# Patient Record
(demographics unavailable — no encounter records)

---

## 2024-10-08 NOTE — DISCUSSION/SUMMARY
[] : The components of the vaccine(s) to be administered today are listed in the plan of care. The disease(s) for which the vaccine(s) are intended to prevent and the risks have been discussed with the caretaker.  The risks are also included in the appropriate vaccination information statements which have been provided to the patient's caregiver.  The caregiver has given consent to vaccinate. [FreeTextEntry1] : 1 yo well, nml development BMI 96%, healthy nutrition discussed, advised d/c bottles declined flu vaccine  labs drawn Continue cow's milk, may switch to lower fat. Continue table foods, 3 meals with 2-3 snacks per day. Incorporate fluorinated water daily in a cup. Brush teeth twice a day with soft toothbrush. Recommend visit to dentist. When in car, keep child in rear-facing car seats until age 2, or until  the maximum height and weight for seat is reached. Put toddler to sleep in own bed. Help toddler to maintain consistent daily routines and sleep schedule. Toilet training discussed. Ensure home is safe. Use consistent, positive discipline. Read aloud to toddler. Limit screen time to no more than 2 hours per day.

## 2024-10-08 NOTE — DEVELOPMENTAL MILESTONES
[Normal Development] : Normal Development [None] : none [Scoops well with spoon] : scoops well with spoon [Uses 50 words] : uses 50 words [Combine 2 words into phrase or] : combines 2 words into phrase or sentences [Kicks ball] : kicks ball  [Jumps off ground with 2 feet] : jumps off ground with 2 feet [Runs with coordination] : runs with coordination [Climbs up a ladder at a] : climbs up a ladder at a playground [Stacks objects] : stacks objects [Passed] : passed [Yes] : Completed.

## 2024-10-08 NOTE — PHYSICAL EXAM

## 2024-10-08 NOTE — HISTORY OF PRESENT ILLNESS
[Mother] : mother [Cow's milk (Ounces per day ___)] : consumes [unfilled] oz of Cow's milk per day [Fruit] : fruit [Vegetables] : vegetables [Meat] : meat [Eggs] : eggs [Dairy] : dairy [Normal] : Normal [Brushing teeth] : Brushing teeth [No] : Patient does not go to dentist yearly [de-identified] : drinks multiple bottles

## 2025-02-20 NOTE — DISCUSSION/SUMMARY
[FreeTextEntry1] : well 2.6 yo, nml growth bmi 91%, healthy nutrition discussed declined flu vaccine labs done 10/24 wnl May switch to lower fat milk. Continue table foods, 3 meals with 2-3 snacks per day. Incorporate fluorinated water daily in a cup. Brush teeth twice a day with soft toothbrush. Recommend visit to dentist. When in car, keep child in rear-facing car seats until age 2, or until  the maximum height and weight for seat is reached. Put toddler to sleep in own bed. Help toddler to maintain consistent daily routines and sleep schedule. Toilet training discussed. Ensure home is safe. Use consistent, positive discipline. Read aloud to toddler. Limit screen time to no more than 2 hours per day.

## 2025-02-20 NOTE — PHYSICAL EXAM
[Alert] : alert [No Acute Distress] : no acute distress [Playful] : playful [Normocephalic] : normocephalic [Conjunctivae with no discharge] : conjunctivae with no discharge [PERRL] : PERRL [EOMI Bilateral] : EOMI bilateral [Auricles Well Formed] : auricles well formed [No Discharge] : no discharge [Nares Patent] : nares patent [Pink Nasal Mucosa] : pink nasal mucosa [Palate Intact] : palate intact [Uvula Midline] : uvula midline [Nonerythematous Oropharynx] : nonerythematous oropharynx [No Caries] : no caries [Trachea Midline] : trachea midline [Supple, full passive range of motion] : supple, full passive range of motion [No Palpable Masses] : no palpable masses [Symmetric Chest Rise] : symmetric chest rise [Clear to Auscultation Bilaterally] : clear to auscultation bilaterally [Normoactive Precordium] : normoactive precordium [Regular Rate and Rhythm] : regular rate and rhythm [Normal S1, S2 present] : normal S1, S2 present [No Murmurs] : no murmurs [+2 Femoral Pulses] : +2 femoral pulses [Soft] : soft [NonTender] : non tender [Non Distended] : non distended [Normoactive Bowel Sounds] : normoactive bowel sounds [No Hepatomegaly] : no hepatomegaly [No Splenomegaly] : no splenomegaly [Ford 1] : Ford 1 [No Clitoromegaly] : no clitoromegaly [Normal Vagina Introitus] : normal vagina introitus [Patent] : patent [Normally Placed] : normally placed [No Abnormal Lymph Nodes Palpated] : no abnormal lymph nodes palpated [Symmetric Buttocks Creases] : symmetric buttocks creases [Symmetric Hip Rotation] : symmetric hip rotation [No Gait Asymmetry] : no gait asymmetry [No pain or deformities with palpation of bone, muscles, joints] : no pain or deformities with palpation of bone, muscles, joints [Normal Muscle Tone] : normal muscle tone [No Spinal Dimple] : no spinal dimple [NoTuft of Hair] : no tuft of hair [Straight] : straight [+2 Patella DTR] : +2 patella DTR [Cranial Nerves Grossly Intact] : cranial nerves grossly intact [No Rash or Lesions] : no rash or lesions

## 2025-02-20 NOTE — HISTORY OF PRESENT ILLNESS
[Mother] : mother [whole ___ oz/d] : consumes [unfilled] oz of whole milk per day [Fruit] : fruit [Vegetables] : vegetables [Meat] : meat [Eggs] : eggs [Fish] : fish [Normal] : Normal [Brushing teeth] : Brushing teeth [No] : Patient does not go to dentist yearly [de-identified] : drinks sippy cup milk before bed

## 2025-02-20 NOTE — DEVELOPMENTAL MILESTONES
[Normal Development] : Normal Development [None] : none [Urinates in a potty or toilet] : urinates in a potty or toilet [Plays pretend with toys or dolls] : plays pretend with toys or dolls [Pokes food with fork] : pokes food with fork [Uses pronouns correctly] : does not use pronouns correctly [Explains the reason for things,] : explains the reason for things, such as needing a sweater when it's cold [Names at least one color] : names at least one color [Walks up steps, using one] : walks up steps, using one foot, then the other [Grasps crayon with thumb] : grasps crayon with thumb and fingers instead of fist [Copies a vertical line] : copies a vertical line [Yes] : Completed.

## 2025-03-05 NOTE — DISCUSSION/SUMMARY
[FreeTextEntry1] : Epistaxis x 2  PE all nl  If recurrs call me to ENT and blood test.   nasal gel prn.  Humidifier, clean it daily

## 2025-03-05 NOTE — HISTORY OF PRESENT ILLNESS
[FreeTextEntry6] : marzena had a small nosebleed 10 d ago, father's other daughters have this sometimes. No bleeding disorder in father family.   Today Bijal had a large nosebleed - 3 tissues of blood, R nostril.  No other easy bleeding.  Air quality in studio appt, father thinks that is the cause of it.

## 2025-05-20 NOTE — HISTORY OF PRESENT ILLNESS
[de-identified] : diarrhea [FreeTextEntry6] : resolved fever 4 days ago poor appetite, drinking no vomiting diarrhea yesterday, 4 times, watery abdominal discomfort 2 days cough from last night

## 2025-05-20 NOTE — DISCUSSION/SUMMARY
[FreeTextEntry1] : 3 yo with resolved fever, diarrhea, uri, appears well fluids  refrain from dairy 1-2 weeks BRAT diet follow up if symptoms worsen or persist more than 3 days